# Patient Record
Sex: MALE | Race: OTHER | HISPANIC OR LATINO | URBAN - METROPOLITAN AREA
[De-identification: names, ages, dates, MRNs, and addresses within clinical notes are randomized per-mention and may not be internally consistent; named-entity substitution may affect disease eponyms.]

---

## 2018-01-10 NOTE — MISCELLANEOUS
Message  Message Free Text Note Form: student forgot to bring his glasses  screening will be done at school nurse office        Signatures   Electronically signed by : Lindsey Arthur, ; Mar  2 2016 10:46AM EST                       (Author)    Electronically signed by : Jaylene Mckeon, [facility]; Mar  2 2016 11:07AM EST                       (Author)    Electronically signed by : KATHRYN Pedersen MSWM AYON ,MSW; Mar 22 2016  3:02PM EST                       (Administrative)

## 2018-01-13 NOTE — PROGRESS NOTES
Discussion/Summary    Celine Yip presented for follow-up on vision with glasses today  He's doing well and has "brand new glasses" but they are at home in a different backpack today  He has swimming for gym so he brought a different bag today  We'll bring him back out in 1 week to check vision with glasses  He's otherwise connected and has no questions or issues today  -Stanley Morgan PA-C  Chief Complaint  Student is here for F/U visit to [facility]  He is connected to [facility], PCP and Dental  Here today to re-check vison but forgot his glasses at home  Will return in 1 week with glasses to repeat eye screening  PP/RN      Active Problems    1  Asthma (493 90) (J45 909)   2  Routine eye exam (V72 0) (Z01 00)    Past Medical History    1  History of concussion (V15 52) (K37 118)    Surgical History    1  Denied: History of Previous Surgery - During Childhood    Family History    1  No pertinent family history    Social History    ·    · Lives with father (single parent)   · Never a smoker   · No tobacco/smoke exposure    Current Meds   1  Ventolin  (90 Base) MCG/ACT Inhalation Aerosol Solution; Therapy: (Recorded:[id number]) to Recorded    Allergies    1  Penicillins    End of Encounter Meds    1  Ventolin  (90 Base) MCG/ACT Inhalation Aerosol Solution;    Therapy: (Recorded:[id number]) to Recorded    Future Appointments    Date/Time Provider Specialty Site   03/02/2016 11:20 AM [place], [place] [address]   Electronically signed by : Sylvia Montana, [facility]; Feb 24 2016 11:27AM EST                       (Author)    Electronically signed by : KATHRYN Cartagena MSWKATHRYN D ,MSW; Mar 22 2016  2:52PM EST                       (Administrative)

## 2023-07-03 ENCOUNTER — HOSPITAL ENCOUNTER (EMERGENCY)
Facility: HOSPITAL | Age: 28
Discharge: HOME | End: 2023-07-03
Attending: EMERGENCY MEDICINE

## 2023-07-03 VITALS
SYSTOLIC BLOOD PRESSURE: 107 MMHG | WEIGHT: 118.83 LBS | RESPIRATION RATE: 19 BRPM | OXYGEN SATURATION: 98 % | DIASTOLIC BLOOD PRESSURE: 77 MMHG | BODY MASS INDEX: 19.62 KG/M2 | HEART RATE: 84 BPM | TEMPERATURE: 98.1 F

## 2023-07-03 DIAGNOSIS — L03.113 CELLULITIS OF RIGHT UPPER EXTREMITY: ICD-10-CM
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
## 2023-07-03 PROCEDURE — 99281 EMR DPT VST MAYX REQ PHY/QHP: CPT

## 2023-07-03 PROCEDURE — 99284 EMERGENCY DEPT VISIT MOD MDM: CPT | Performed by: EMERGENCY MEDICINE

## 2023-07-03 RX ORDER — CEPHALEXIN 500 MG/1
500 CAPSULE ORAL 4 TIMES DAILY
Qty: 28 CAPSULE | Refills: 0 | Status: SHIPPED | OUTPATIENT
Start: 2023-07-03 | End: 2023-07-10

## 2023-07-03 NOTE — ED PROVIDER NOTES
History  Chief Complaint   Patient presents with   • Insect Bite     Bug bite 2 days ago. Pt reports red line from bite on R wrist up to elbow since. Swelling noted to R wrist. Denies fevers       History provided by:  Patient  Possible insect bite to volar right wrist a few days ago although not witnessed. Does work landscaping. He noted that there was itching and within few hours there was erythema going up his arm following the course of the vein. He is never had any fever. There are some mild pain and there is been some continued erythema and some swelling around the wrist.  There is some swelling along the volar wrist but the overall wrist joint itself has no effusion. No numbness or tingling. No weakness. No drainage. The erythema that was streaking up the arm was much worse before and that is a little bit better at the present time. Has been putting antibiotic ointment on it. Denies IV drug use. None       History reviewed. No pertinent past medical history. History reviewed. No pertinent surgical history. History reviewed. No pertinent family history. I have reviewed and agree with the history as documented. E-Cigarette/Vaping     E-Cigarette/Vaping Substances     Social History     Tobacco Use   • Smoking status: Never   • Smokeless tobacco: Never   Substance Use Topics   • Alcohol use: Never   • Drug use: Never       Review of Systems    Physical Exam  Physical Exam  Vitals and nursing note reviewed. Constitutional:       General: He is not in acute distress. Appearance: Normal appearance. He is not ill-appearing, toxic-appearing or diaphoretic. HENT:      Head: Normocephalic and atraumatic. Cardiovascular:      Rate and Rhythm: Normal rate and regular rhythm. Pulses: Normal pulses. Pulmonary:      Effort: Pulmonary effort is normal. No respiratory distress. Musculoskeletal:         General: Swelling and tenderness present. No deformity. Normal range of motion. Comments: There is some erythema on the volar wrist with a very small puncture type wound on the volar aspect. There is no fluctuance. There is a very fine erythema that had progressed up the arm towards the axilla however that is improved per the patient. There is no wrist effusion. Minimally tender minimal warmth. No axillary adenopathy. Skin:     General: Skin is warm. Findings: Erythema present. Neurological:      General: No focal deficit present. Mental Status: He is alert. Sensory: No sensory deficit. Motor: No weakness. Psychiatric:         Mood and Affect: Mood normal.         Vital Signs  ED Triage Vitals [07/03/23 1500]   Temperature Pulse Respirations Blood Pressure SpO2   98.1 °F (36.7 °C) 84 19 107/77 98 %      Temp Source Heart Rate Source Patient Position - Orthostatic VS BP Location FiO2 (%)   Oral Monitor Sitting Right arm --      Pain Score       --           Vitals:    07/03/23 1500   BP: 107/77   Pulse: 84   Patient Position - Orthostatic VS: Sitting         Visual Acuity      ED Medications  Medications - No data to display    Diagnostic Studies  Results Reviewed     None                 No orders to display              Procedures  Procedures         ED Course                                             Medical Decision Making  Given the rapidity of the symptoms after this reported insect sting suspect the possibility of more of an allergic type reaction as he has never had a fever. There is no shortness of breath. The erythema has somewhat improved on the upper arm but there is some erythema around the wrist.  We will treat for the possibility of some mild cellulitis and there is no fluid collection present nor is there any joint effusion. The patient is afebrile and has no SIRS criteria. Has no insurance resources given to get primary care, ambulatory referral placed so he can get the wound itself recheck.   Instructed to return for fever or worsening pain/swelling. No x-ray indicated at the present time as there was no acute traumatic injury. No bloodwork indicated at this time as the patient is systemically well and has no SIRS criteria. Risk  Prescription drug management. Disposition  Final diagnoses:   Insect bite   Cellulitis of right upper extremity     Time reflects when diagnosis was documented in both MDM as applicable and the Disposition within this note     Time User Action Codes Description Comment    7/3/2023  3:19 PM Brandon Nicole Add [V10. XXXA] Insect bite     7/3/2023  3:20 PM Brandon Nicole Add [N50.388] Cellulitis of right upper extremity       ED Disposition     ED Disposition   Discharge    Condition   Stable    Date/Time   Mon Jul 3, 2023  3:19 PM    Comment   Skyler Pancoast discharge to home/self care.                Follow-up Information     Follow up With Specialties Details Why Contact Info Additional [facility] Medicine Schedule an appointment as soon as possible for a visit in 1 week reevaluation [address] [place] [phone]  [address] [place] [address], [address] [place] [address], [address] [place] [address]          Discharge Medication List as of 7/3/2023  3:22 PM      START taking these medications    Details   cephalexin (KEFLEX) 500 mg capsule Take 1 capsule (500 mg total) by mouth 4 (four) times a day for 7 days, Starting Mon 7/3/2023, Until Mon 7/10/2023, Normal                 PDMP Review     None          ED Provider  Electronically Signed by           Earl Fuentes MD  07/04/23 7511